# Patient Record
Sex: MALE | Race: WHITE | NOT HISPANIC OR LATINO | Employment: OTHER | URBAN - METROPOLITAN AREA
[De-identification: names, ages, dates, MRNs, and addresses within clinical notes are randomized per-mention and may not be internally consistent; named-entity substitution may affect disease eponyms.]

---

## 2023-07-25 ENCOUNTER — OFFICE VISIT (OUTPATIENT)
Dept: PRIMARY CARE | Facility: CLINIC | Age: 73
End: 2023-07-25
Payer: MEDICARE

## 2023-07-25 VITALS
TEMPERATURE: 96.5 F | OXYGEN SATURATION: 99 % | BODY MASS INDEX: 20.33 KG/M2 | SYSTOLIC BLOOD PRESSURE: 172 MMHG | HEART RATE: 71 BPM | WEIGHT: 142 LBS | HEIGHT: 70 IN | DIASTOLIC BLOOD PRESSURE: 95 MMHG

## 2023-07-25 DIAGNOSIS — B18.2 CHRONIC HEPATITIS C WITHOUT HEPATIC COMA (MULTI): Primary | ICD-10-CM

## 2023-07-25 DIAGNOSIS — Z00.00 HEALTHCARE MAINTENANCE: ICD-10-CM

## 2023-07-25 DIAGNOSIS — Z12.5 PROSTATE CANCER SCREENING: ICD-10-CM

## 2023-07-25 DIAGNOSIS — I10 ESSENTIAL (PRIMARY) HYPERTENSION: ICD-10-CM

## 2023-07-25 DIAGNOSIS — R22.2 SUBCUTANEOUS NODULE OF BACK: ICD-10-CM

## 2023-07-25 DIAGNOSIS — R42 INTERMITTENT LIGHTHEADEDNESS: ICD-10-CM

## 2023-07-25 DIAGNOSIS — R03.0 ELEVATED BLOOD PRESSURE READING: ICD-10-CM

## 2023-07-25 PROBLEM — L03.115 CELLULITIS OF RIGHT LOWER LEG: Status: ACTIVE | Noted: 2023-07-25

## 2023-07-25 PROBLEM — L02.811 ABSCESS OF HEAD: Status: ACTIVE | Noted: 2023-07-25

## 2023-07-25 PROCEDURE — 3080F DIAST BP >= 90 MM HG: CPT | Performed by: INTERNAL MEDICINE

## 2023-07-25 PROCEDURE — 1159F MED LIST DOCD IN RCRD: CPT | Performed by: INTERNAL MEDICINE

## 2023-07-25 PROCEDURE — 3077F SYST BP >= 140 MM HG: CPT | Performed by: INTERNAL MEDICINE

## 2023-07-25 PROCEDURE — 1126F AMNT PAIN NOTED NONE PRSNT: CPT | Performed by: INTERNAL MEDICINE

## 2023-07-25 PROCEDURE — 1036F TOBACCO NON-USER: CPT | Performed by: INTERNAL MEDICINE

## 2023-07-25 PROCEDURE — 99203 OFFICE O/P NEW LOW 30 MIN: CPT | Performed by: INTERNAL MEDICINE

## 2023-07-25 RX ORDER — VELPATASVIR AND SOFOSBUVIR 100; 400 MG/1; MG/1
1 TABLET, FILM COATED ORAL DAILY
COMMUNITY
Start: 2019-11-11 | End: 2024-03-18 | Stop reason: WASHOUT

## 2023-07-25 RX ORDER — CEPHALEXIN 500 MG/1
500 CAPSULE ORAL 4 TIMES DAILY
COMMUNITY
Start: 2019-12-26 | End: 2023-07-25 | Stop reason: ALTCHOICE

## 2023-07-25 RX ORDER — SULFAMETHOXAZOLE AND TRIMETHOPRIM 800; 160 MG/1; MG/1
1 TABLET ORAL 2 TIMES DAILY
COMMUNITY
Start: 2019-12-26 | End: 2023-07-25 | Stop reason: ALTCHOICE

## 2023-07-25 ASSESSMENT — LIFESTYLE VARIABLES
AUDIT-C TOTAL SCORE: 0
HOW MANY STANDARD DRINKS CONTAINING ALCOHOL DO YOU HAVE ON A TYPICAL DAY: PATIENT DOES NOT DRINK
HOW OFTEN DO YOU HAVE A DRINK CONTAINING ALCOHOL: NEVER
HOW OFTEN DO YOU HAVE SIX OR MORE DRINKS ON ONE OCCASION: NEVER
SKIP TO QUESTIONS 9-10: 1

## 2023-07-25 ASSESSMENT — ENCOUNTER SYMPTOMS
LOSS OF SENSATION IN FEET: 0
OCCASIONAL FEELINGS OF UNSTEADINESS: 0
DEPRESSION: 0

## 2023-07-25 ASSESSMENT — PAIN SCALES - GENERAL: PAINLEVEL: 0-NO PAIN

## 2023-07-25 ASSESSMENT — PATIENT HEALTH QUESTIONNAIRE - PHQ9
1. LITTLE INTEREST OR PLEASURE IN DOING THINGS: NOT AT ALL
SUM OF ALL RESPONSES TO PHQ9 QUESTIONS 1 & 2: 0
2. FEELING DOWN, DEPRESSED OR HOPELESS: NOT AT ALL

## 2023-07-25 NOTE — ASSESSMENT & PLAN NOTE
BP is elevated today, this is the initial visit, avoid added salt in the diet, recheck vitals in 2-3 weeks

## 2023-07-25 NOTE — PROGRESS NOTES
Subjective   Patient ID: Mark Neal is a 73 y.o. male who presents for New Patient Visit. Patient used to see Dr Hathaway in 2020, he was dropped from practice since he did not follow up.     HPI    Patient presents for a new patient visit. His last blood work was from February 2020.      Fatigue: He does report that he has been feeling more tired for the past 3-4 years. He used to run consistently, but now gets tired quite quickly and feels uncoordinated. He is still active throughout the day, taking care of the farm. He used to be able to work for 4-5 hours without taking a break, and now he typically works for about 1 hour. Patient takes his time, he takes no med therapy     HTN: Elevated BP reading today in the office, 172/95. He reports that his wife has checked it at home, and she thinks it is a little high there too.     Chronic hepatitis C: Has a history of chronic hepatitis C. This appears to be have been treated throughout 2019 (Epclusa). Has not had any repeat screening since then.     Health maintenance: Has had Cologuard screening done, sent it in today. Does not get flu shots. Covid vaccine x3. Thinks he is up to date on tetanus. Does not recall Shingles vaccine or pneumonia vaccines.   Former smoker between [age], but only occasionally. Does smoke marijuana daily.   Drinks about 5 alcoholic beverages per week.      Review of Systems  In addition to that documented in the HPI above, the additional ROS was obtained:  Constitutional: Denies fevers or chills, denies recent weight loss or weight gain  ENT: does complain of hearing loss (did get a hearing evaluation and hearing aids, but these did not help)  Eyes: Denies vision changes, wearing glasses for 20+ years, denies blurred vision or double vision  ENMT: Denies trouble swallowing  Cardiovascular: Denies chest pain or heart racing  Respiratory: Denies SOB or cough  Gastrointestinal: Denies nausea, vomiting, diarrhea or constipation, denies dark  "stools  Musculoskeletal: Complains of low back pain, denies other joint pain or stiffness  Neuro: denies frequent headaches, did have an episode of dizziness about 3 years ago while driving which resolved after a few minutes, this happens again about every 6 months or so, he has developed some blurred vision that quickly resolves. He denies any irregular heartbeat.   Psych: denies depression or anxiety      Objective     Visit Vitals  BP (!) 172/95 (BP Location: Right arm, Patient Position: Sitting, BP Cuff Size: Adult)   Pulse 71   Temp 35.8 °C (96.5 °F) (Temporal)   Ht 1.778 m (5' 10\")   Wt 64.4 kg (142 lb)   SpO2 99%   BMI 20.37 kg/m²   Smoking Status Never   BSA 1.78 m²      Physical Exam  Constitutional: Well nourished, well developed, appears stated age, he is not overweight  Eyes: no scleral icterus, no conjunctival injection, wearing glasses  Ears: normal external auditory canal, no retraction or bulging of tympanic membranes  Neck: no thyromegaly  Cardiovascular: regular rate and rhythm, no leg edema  Respiratory: normal respiratory effort, clear to auscultation bilaterally  Musculoskeletal: No gross deformities appreciated  Skin: Warm, dry. No rashes. Some scratches about the ankles bilaterally (walks barefoot a lot), a large mobile superficial appearing mobile nodule present on the mid back superficial to the lower T spine, it is at least 3 cm in diameter, no skin changes are appreciated at the site  Neurologic: Alert, CNs II-XII grossly intact..  Psych: Appropriate mood and affect.      Health Maintenance Due   Topic Date Due    Lipid Panel  Never done    Medicare Annual Wellness Visit (AWV)  Never done    Colorectal Cancer Screening  Never done    Hepatitis A Vaccines (1 of 2 - Risk 2-dose series) Never done    Zoster Vaccines (1 of 2) Never done    Hepatitis B Vaccines (1 of 3 - Risk 3-dose series) Never done    Pneumococcal Vaccine: 65+ Years (1 - PCV) Never done    COVID-19 Vaccine (4 - Booster for " Pfizer series) 02/05/2022        Assessment/Plan   Problem List Items Addressed This Visit       Chronic hepatitis C (CMS/HCC) - Primary     Prior history of hepatitis C in 2019, treated and resolved.          Relevant Orders    CBC and Auto Differential    Comprehensive metabolic panel    Elevated blood pressure reading     BP is elevated today, this is the initial visit, avoid added salt in the diet, recheck vitals in 2-3 weeks         Relevant Orders    CBC and Auto Differential    Comprehensive metabolic panel    Albumin, urine, random    ECG 12 lead (Ancillary Performed)    Intermittent lightheadedness     Check an ECG, patient may have some orthostatic visual changes. Check routine labs         Relevant Orders    CBC and Auto Differential    Comprehensive metabolic panel    TSH with reflex to Free T4 if abnormal    ECG 12 lead (Ancillary Performed)    Subcutaneous nodule of back     This appears to be a large lipoma or cyst. Patient has had the lesion for almost 40 years. Will not address this now, will order routine labs for screening         Relevant Orders    CBC and Auto Differential    Comprehensive metabolic panel    Healthcare maintenance    Relevant Orders    CBC and Auto Differential    Comprehensive metabolic panel    Lipid panel     Other Visit Diagnoses       Prostate cancer screening        Relevant Orders    Prostate Spec.Ag,Screen    Essential (primary) hypertension        Relevant Orders    TSH with reflex to Free T4 if abnormal          Will check labs. Follow up after lab work,  and ECG, recheck vitals in a few weeks to see if medication for BP is needed.

## 2023-07-25 NOTE — ASSESSMENT & PLAN NOTE
This appears to be a large lipoma or cyst. Patient has had the lesion for almost 40 years. Will not address this now, will order routine labs for screening

## 2023-08-04 ENCOUNTER — LAB (OUTPATIENT)
Dept: LAB | Facility: LAB | Age: 73
End: 2023-08-04
Payer: MEDICARE

## 2023-08-04 DIAGNOSIS — R42 INTERMITTENT LIGHTHEADEDNESS: ICD-10-CM

## 2023-08-04 DIAGNOSIS — Z00.00 HEALTHCARE MAINTENANCE: ICD-10-CM

## 2023-08-04 DIAGNOSIS — R03.0 ELEVATED BLOOD PRESSURE READING: ICD-10-CM

## 2023-08-04 DIAGNOSIS — B18.2 CHRONIC HEPATITIS C WITHOUT HEPATIC COMA (MULTI): ICD-10-CM

## 2023-08-04 DIAGNOSIS — Z12.5 PROSTATE CANCER SCREENING: ICD-10-CM

## 2023-08-04 DIAGNOSIS — R22.2 SUBCUTANEOUS NODULE OF BACK: ICD-10-CM

## 2023-08-04 LAB
ALANINE AMINOTRANSFERASE (SGPT) (U/L) IN SER/PLAS: 10 U/L (ref 10–52)
ALBUMIN (G/DL) IN SER/PLAS: 4.2 G/DL (ref 3.4–5)
ALBUMIN (MG/L) IN URINE: <7 MG/L
ALBUMIN/CREATININE (UG/MG) IN URINE: NORMAL UG/MG CRT (ref 0–30)
ALKALINE PHOSPHATASE (U/L) IN SER/PLAS: 74 U/L (ref 33–136)
ANION GAP IN SER/PLAS: 10 MMOL/L (ref 10–20)
ASPARTATE AMINOTRANSFERASE (SGOT) (U/L) IN SER/PLAS: 18 U/L (ref 9–39)
BASOPHILS (10*3/UL) IN BLOOD BY AUTOMATED COUNT: 0.07 X10E9/L (ref 0–0.1)
BASOPHILS/100 LEUKOCYTES IN BLOOD BY AUTOMATED COUNT: 1.2 % (ref 0–2)
BILIRUBIN TOTAL (MG/DL) IN SER/PLAS: 0.4 MG/DL (ref 0–1.2)
CALCIUM (MG/DL) IN SER/PLAS: 9.2 MG/DL (ref 8.6–10.3)
CARBON DIOXIDE, TOTAL (MMOL/L) IN SER/PLAS: 27 MMOL/L (ref 21–32)
CHLORIDE (MMOL/L) IN SER/PLAS: 107 MMOL/L (ref 98–107)
CHOLESTEROL (MG/DL) IN SER/PLAS: 175 MG/DL (ref 0–199)
CHOLESTEROL IN HDL (MG/DL) IN SER/PLAS: 70 MG/DL
CHOLESTEROL/HDL RATIO: 2.5
CREATININE (MG/DL) IN SER/PLAS: 0.95 MG/DL (ref 0.5–1.3)
CREATININE (MG/DL) IN URINE: 115 MG/DL (ref 20–370)
EOSINOPHILS (10*3/UL) IN BLOOD BY AUTOMATED COUNT: 0.27 X10E9/L (ref 0–0.4)
EOSINOPHILS/100 LEUKOCYTES IN BLOOD BY AUTOMATED COUNT: 4.5 % (ref 0–6)
ERYTHROCYTE DISTRIBUTION WIDTH (RATIO) BY AUTOMATED COUNT: 13.2 % (ref 11.5–14.5)
ERYTHROCYTE MEAN CORPUSCULAR HEMOGLOBIN CONCENTRATION (G/DL) BY AUTOMATED: 33.1 G/DL (ref 32–36)
ERYTHROCYTE MEAN CORPUSCULAR VOLUME (FL) BY AUTOMATED COUNT: 92 FL (ref 80–100)
ERYTHROCYTES (10*6/UL) IN BLOOD BY AUTOMATED COUNT: 4.89 X10E12/L (ref 4.5–5.9)
GFR MALE: 85 ML/MIN/1.73M2
GLUCOSE (MG/DL) IN SER/PLAS: 94 MG/DL (ref 74–99)
HEMATOCRIT (%) IN BLOOD BY AUTOMATED COUNT: 45 % (ref 41–52)
HEMOGLOBIN (G/DL) IN BLOOD: 14.9 G/DL (ref 13.5–17.5)
IMMATURE GRANULOCYTES/100 LEUKOCYTES IN BLOOD BY AUTOMATED COUNT: 0.5 % (ref 0–0.9)
LDL: 97 MG/DL (ref 0–99)
LEUKOCYTES (10*3/UL) IN BLOOD BY AUTOMATED COUNT: 6 X10E9/L (ref 4.4–11.3)
LYMPHOCYTES (10*3/UL) IN BLOOD BY AUTOMATED COUNT: 1.84 X10E9/L (ref 0.8–3)
LYMPHOCYTES/100 LEUKOCYTES IN BLOOD BY AUTOMATED COUNT: 30.7 % (ref 13–44)
MONOCYTES (10*3/UL) IN BLOOD BY AUTOMATED COUNT: 0.48 X10E9/L (ref 0.05–0.8)
MONOCYTES/100 LEUKOCYTES IN BLOOD BY AUTOMATED COUNT: 8 % (ref 2–10)
NEUTROPHILS (10*3/UL) IN BLOOD BY AUTOMATED COUNT: 3.31 X10E9/L (ref 1.6–5.5)
NEUTROPHILS/100 LEUKOCYTES IN BLOOD BY AUTOMATED COUNT: 55.1 % (ref 40–80)
PLATELETS (10*3/UL) IN BLOOD AUTOMATED COUNT: 244 X10E9/L (ref 150–450)
POTASSIUM (MMOL/L) IN SER/PLAS: 4.4 MMOL/L (ref 3.5–5.3)
PROSTATE SPECIFIC ANTIGEN,SCREEN: 2.53 NG/ML (ref 0–4)
PROTEIN TOTAL: 7 G/DL (ref 6.4–8.2)
SODIUM (MMOL/L) IN SER/PLAS: 140 MMOL/L (ref 136–145)
TRIGLYCERIDE (MG/DL) IN SER/PLAS: 38 MG/DL (ref 0–149)
UREA NITROGEN (MG/DL) IN SER/PLAS: 17 MG/DL (ref 6–23)
VLDL: 8 MG/DL (ref 0–40)

## 2023-08-04 PROCEDURE — 82570 ASSAY OF URINE CREATININE: CPT

## 2023-08-04 PROCEDURE — 85025 COMPLETE CBC W/AUTO DIFF WBC: CPT

## 2023-08-04 PROCEDURE — G0103 PSA SCREENING: HCPCS

## 2023-08-04 PROCEDURE — 36415 COLL VENOUS BLD VENIPUNCTURE: CPT

## 2023-08-04 PROCEDURE — 80061 LIPID PANEL: CPT

## 2023-08-04 PROCEDURE — 82043 UR ALBUMIN QUANTITATIVE: CPT

## 2023-08-04 PROCEDURE — 80053 COMPREHEN METABOLIC PANEL: CPT

## 2023-08-08 ENCOUNTER — CLINICAL SUPPORT (OUTPATIENT)
Dept: PRIMARY CARE | Facility: CLINIC | Age: 73
End: 2023-08-08
Payer: MEDICARE

## 2023-08-08 VITALS
DIASTOLIC BLOOD PRESSURE: 84 MMHG | RESPIRATION RATE: 16 BRPM | OXYGEN SATURATION: 99 % | SYSTOLIC BLOOD PRESSURE: 144 MMHG | HEART RATE: 78 BPM | TEMPERATURE: 97.1 F

## 2023-08-08 DIAGNOSIS — R03.0 ELEVATED BLOOD PRESSURE READING: ICD-10-CM

## 2023-08-08 DIAGNOSIS — I10 PRIMARY HYPERTENSION: Primary | ICD-10-CM

## 2023-08-08 PROCEDURE — 99211 OFF/OP EST MAY X REQ PHY/QHP: CPT | Performed by: INTERNAL MEDICINE

## 2023-08-08 NOTE — PROGRESS NOTES
BP check 144/84 right arm   He also brought in a list of readings from the last few days (they were not dated)    145/78  134/93  154/76  137/85  149/94  127/79

## 2023-08-22 RX ORDER — LISINOPRIL 10 MG/1
10 TABLET ORAL DAILY
Qty: 30 TABLET | Refills: 5 | Status: SHIPPED | OUTPATIENT
Start: 2023-08-22 | End: 2023-09-06

## 2023-09-06 DIAGNOSIS — I10 PRIMARY HYPERTENSION: ICD-10-CM

## 2023-09-06 RX ORDER — LISINOPRIL 10 MG/1
10 TABLET ORAL DAILY
Qty: 90 TABLET | Refills: 2 | Status: SHIPPED | OUTPATIENT
Start: 2023-09-06 | End: 2024-06-04

## 2024-01-18 ENCOUNTER — LAB (OUTPATIENT)
Dept: LAB | Facility: LAB | Age: 74
End: 2024-01-18
Payer: MEDICARE

## 2024-01-18 DIAGNOSIS — I10 ESSENTIAL (PRIMARY) HYPERTENSION: ICD-10-CM

## 2024-01-18 DIAGNOSIS — R42 INTERMITTENT LIGHTHEADEDNESS: ICD-10-CM

## 2024-01-18 LAB — TSH SERPL-ACNC: 1.19 MIU/L (ref 0.44–3.98)

## 2024-01-18 PROCEDURE — 36415 COLL VENOUS BLD VENIPUNCTURE: CPT

## 2024-01-18 PROCEDURE — 84443 ASSAY THYROID STIM HORMONE: CPT

## 2024-03-18 ENCOUNTER — OFFICE VISIT (OUTPATIENT)
Dept: PRIMARY CARE | Facility: CLINIC | Age: 74
End: 2024-03-18
Payer: MEDICARE

## 2024-03-18 VITALS
HEART RATE: 69 BPM | RESPIRATION RATE: 16 BRPM | TEMPERATURE: 97.8 F | SYSTOLIC BLOOD PRESSURE: 140 MMHG | OXYGEN SATURATION: 99 % | HEIGHT: 70 IN | BODY MASS INDEX: 22.33 KG/M2 | DIASTOLIC BLOOD PRESSURE: 80 MMHG | WEIGHT: 156 LBS

## 2024-03-18 DIAGNOSIS — Z12.11 COLON CANCER SCREENING: ICD-10-CM

## 2024-03-18 DIAGNOSIS — I10 ESSENTIAL HYPERTENSION: ICD-10-CM

## 2024-03-18 DIAGNOSIS — Z00.00 ROUTINE GENERAL MEDICAL EXAMINATION AT HEALTH CARE FACILITY: Primary | ICD-10-CM

## 2024-03-18 DIAGNOSIS — B18.2 CHRONIC HEPATITIS C WITHOUT HEPATIC COMA (MULTI): ICD-10-CM

## 2024-03-18 DIAGNOSIS — Z12.5 SCREENING FOR PROSTATE CANCER: ICD-10-CM

## 2024-03-18 DIAGNOSIS — Z13.220 LIPID SCREENING: ICD-10-CM

## 2024-03-18 PROBLEM — R42 DIZZINESS AND GIDDINESS: Status: ACTIVE | Noted: 2023-07-25

## 2024-03-18 PROCEDURE — 1159F MED LIST DOCD IN RCRD: CPT | Performed by: INTERNAL MEDICINE

## 2024-03-18 PROCEDURE — 1170F FXNL STATUS ASSESSED: CPT | Performed by: INTERNAL MEDICINE

## 2024-03-18 PROCEDURE — 3077F SYST BP >= 140 MM HG: CPT | Performed by: INTERNAL MEDICINE

## 2024-03-18 PROCEDURE — 1160F RVW MEDS BY RX/DR IN RCRD: CPT | Performed by: INTERNAL MEDICINE

## 2024-03-18 PROCEDURE — 1036F TOBACCO NON-USER: CPT | Performed by: INTERNAL MEDICINE

## 2024-03-18 PROCEDURE — 3079F DIAST BP 80-89 MM HG: CPT | Performed by: INTERNAL MEDICINE

## 2024-03-18 PROCEDURE — 99214 OFFICE O/P EST MOD 30 MIN: CPT | Performed by: INTERNAL MEDICINE

## 2024-03-18 PROCEDURE — 1123F ACP DISCUSS/DSCN MKR DOCD: CPT | Performed by: INTERNAL MEDICINE

## 2024-03-18 PROCEDURE — G0439 PPPS, SUBSEQ VISIT: HCPCS | Performed by: INTERNAL MEDICINE

## 2024-03-18 SDOH — ECONOMIC STABILITY: FOOD INSECURITY: WITHIN THE PAST 12 MONTHS, THE FOOD YOU BOUGHT JUST DIDN'T LAST AND YOU DIDN'T HAVE MONEY TO GET MORE.: NEVER TRUE

## 2024-03-18 SDOH — ECONOMIC STABILITY: FOOD INSECURITY: WITHIN THE PAST 12 MONTHS, YOU WORRIED THAT YOUR FOOD WOULD RUN OUT BEFORE YOU GOT MONEY TO BUY MORE.: NEVER TRUE

## 2024-03-18 ASSESSMENT — ENCOUNTER SYMPTOMS
LOSS OF SENSATION IN FEET: 0
DEPRESSION: 0
OCCASIONAL FEELINGS OF UNSTEADINESS: 0

## 2024-03-18 ASSESSMENT — ACTIVITIES OF DAILY LIVING (ADL)
TAKING_MEDICATION: INDEPENDENT
BATHING: INDEPENDENT
MANAGING_FINANCES: INDEPENDENT
DRESSING: INDEPENDENT
GROCERY_SHOPPING: INDEPENDENT
DOING_HOUSEWORK: INDEPENDENT

## 2024-03-18 ASSESSMENT — PATIENT HEALTH QUESTIONNAIRE - PHQ9
SUM OF ALL RESPONSES TO PHQ9 QUESTIONS 1 & 2: 0
2. FEELING DOWN, DEPRESSED OR HOPELESS: NOT AT ALL
1. LITTLE INTEREST OR PLEASURE IN DOING THINGS: NOT AT ALL

## 2024-03-18 ASSESSMENT — LIFESTYLE VARIABLES
AUDIT-C TOTAL SCORE: 3
HOW MANY STANDARD DRINKS CONTAINING ALCOHOL DO YOU HAVE ON A TYPICAL DAY: 1 OR 2
SKIP TO QUESTIONS 9-10: 1
HOW OFTEN DO YOU HAVE SIX OR MORE DRINKS ON ONE OCCASION: NEVER
HOW OFTEN DO YOU HAVE A DRINK CONTAINING ALCOHOL: 2-3 TIMES A WEEK

## 2024-03-18 NOTE — PROGRESS NOTES
"Subjective   Reason for Visit: Mark Neal is an 73 y.o. male here for a Medicare Wellness visit.     Past Medical, Surgical, and Family History reviewed and updated in chart.    Reviewed all medications by prescribing practitioner or clinical pharmacist (such as prescriptions, OTCs, herbal therapies and supplements) and documented in the medical record.    HPI    Follow up and Medicare wellness exam:     Fatigue:  Patient notes that dizziness has disappeared. He notes more tiredness when he is not as active in the winter.  Patient takes his time, he takes no med therapy      HTN: Elevated BP reading today in the office, 140/80. Patient only takes lisinopril 10 mg daily       Chronic hepatitis C: Has a history of chronic hepatitis C. This appears to be have been treated throughout 2019 (Epclusa). Has not had any repeat screening since then. No detectable hepatitis C is noted now     Health maintenance: Has had Cologuard screening last year he states. Does not get flu shots. Covid vaccine x3. Thinks he is up to date on tetanus. Does not recall Shingles vaccine or pneumonia vaccines, he does not want these today.   Former smoker between [age], but only occasionally. Does smoke marijuana daily.   Drinks about 5 alcoholic beverages per week.    Subcutaneous nodule on back: this is not new, no changes noted. Patient is hesitant about having surgical procedure now       Patient Care Team:  Henri Hernández MD as PCP - General  Henri Hernández MD as PCP - [employer] Medicare Advantage PCP     Review of Systems    See HPI, no further complaints noted    Objective   Vitals:  /80 (BP Location: Left arm, Patient Position: Sitting, BP Cuff Size: Adult)   Pulse 69   Temp 36.6 °C (97.8 °F)   Resp 16   Ht 1.778 m (5' 10\")   Wt 70.8 kg (156 lb)   SpO2 99%   BMI 22.38 kg/m²       Physical Exam    Constitutional: Well nourished, well developed, appears stated age, he is not overweight  Eyes: no scleral icterus, " no conjunctival injection, wearing glasses  Ears: normal external auditory canal, no retraction or bulging of tympanic membranes  Neck: no thyromegaly, no neck masses or adenopathy  Cardiovascular: regular rate and rhythm, no leg edema, no carotid bruits noted  Respiratory: normal respiratory effort, a few rhonchi that clear with cough, clear to auscultation bilaterally  Musculoskeletal: No gross deformities appreciated  Skin: Warm, dry. No rashes. Some scratches about the ankles bilaterally (walks barefoot a lot), a large mobile superficial appearing mobile nodule present on the mid back superficial to the lower T spine, it is at least 3 cm in diameter, no skin changes are appreciated at the site, this was present last year also  Neurologic: Alert, CNs II-XII grossly intact..  Psych: Appropriate mood and affect.    Assessment/Plan   Problem List Items Addressed This Visit       Chronic hepatitis C (CMS/HCC)    Current Assessment & Plan     Resolved after treatment, most recent viral load was not detectable         Essential hypertension    Current Assessment & Plan     Controlled on lisinopril, no med changes         Routine general medical examination at health care facility - Primary    Current Assessment & Plan     Notify the office when the patient is ready to get his vaccines completed, he defers today, Cologuard screening cannot be documented, will reordered.           Other Visit Diagnoses       Colon cancer screening        Relevant Orders    Cologuard® colon cancer screening          Check labs in 6 months, follow up in 6 months, Medicare wellness exam completed

## 2024-03-18 NOTE — ASSESSMENT & PLAN NOTE
Notify the office when the patient is ready to get his vaccines completed, he defers today, Cologuard screening cannot be documented, will reordered.

## 2024-03-29 LAB — NONINV COLON CA DNA+OCC BLD SCRN STL QL: NEGATIVE

## 2024-06-03 DIAGNOSIS — I10 PRIMARY HYPERTENSION: ICD-10-CM

## 2024-06-04 RX ORDER — LISINOPRIL 10 MG/1
10 TABLET ORAL DAILY
Qty: 90 TABLET | Refills: 2 | Status: SHIPPED | OUTPATIENT
Start: 2024-06-04

## 2024-09-17 ENCOUNTER — LAB (OUTPATIENT)
Dept: LAB | Facility: LAB | Age: 74
End: 2024-09-17
Payer: MEDICARE

## 2024-09-17 DIAGNOSIS — B18.2 CHRONIC HEPATITIS C WITHOUT HEPATIC COMA (MULTI): ICD-10-CM

## 2024-09-17 DIAGNOSIS — Z13.220 LIPID SCREENING: ICD-10-CM

## 2024-09-17 DIAGNOSIS — Z00.00 ROUTINE GENERAL MEDICAL EXAMINATION AT HEALTH CARE FACILITY: ICD-10-CM

## 2024-09-17 DIAGNOSIS — I10 ESSENTIAL HYPERTENSION: ICD-10-CM

## 2024-09-17 DIAGNOSIS — Z12.5 SCREENING FOR PROSTATE CANCER: ICD-10-CM

## 2024-09-17 DIAGNOSIS — Z12.11 COLON CANCER SCREENING: ICD-10-CM

## 2024-09-17 LAB
ALBUMIN SERPL BCP-MCNC: 4.1 G/DL (ref 3.4–5)
ALP SERPL-CCNC: 69 U/L (ref 33–136)
ALT SERPL W P-5'-P-CCNC: 11 U/L (ref 10–52)
ANION GAP SERPL CALC-SCNC: 11 MMOL/L (ref 10–20)
AST SERPL W P-5'-P-CCNC: 18 U/L (ref 9–39)
BASOPHILS # BLD AUTO: 0.05 X10*3/UL (ref 0–0.1)
BASOPHILS NFR BLD AUTO: 0.7 %
BILIRUB SERPL-MCNC: 0.4 MG/DL (ref 0–1.2)
BUN SERPL-MCNC: 17 MG/DL (ref 6–23)
CALCIUM SERPL-MCNC: 8.9 MG/DL (ref 8.6–10.3)
CHLORIDE SERPL-SCNC: 104 MMOL/L (ref 98–107)
CHOLEST SERPL-MCNC: 185 MG/DL (ref 0–199)
CHOLESTEROL/HDL RATIO: 2.3
CO2 SERPL-SCNC: 28 MMOL/L (ref 21–32)
CREAT SERPL-MCNC: 1.01 MG/DL (ref 0.5–1.3)
CREAT UR-MCNC: 101.2 MG/DL (ref 20–370)
EGFRCR SERPLBLD CKD-EPI 2021: 79 ML/MIN/1.73M*2
EOSINOPHIL # BLD AUTO: 0.16 X10*3/UL (ref 0–0.4)
EOSINOPHIL NFR BLD AUTO: 2.2 %
ERYTHROCYTE [DISTWIDTH] IN BLOOD BY AUTOMATED COUNT: 12.9 % (ref 11.5–14.5)
GLUCOSE SERPL-MCNC: 82 MG/DL (ref 74–99)
HCT VFR BLD AUTO: 44.1 % (ref 41–52)
HDLC SERPL-MCNC: 79 MG/DL
HGB BLD-MCNC: 14.4 G/DL (ref 13.5–17.5)
IMM GRANULOCYTES # BLD AUTO: 0.03 X10*3/UL (ref 0–0.5)
IMM GRANULOCYTES NFR BLD AUTO: 0.4 % (ref 0–0.9)
LDLC SERPL CALC-MCNC: 97 MG/DL
LYMPHOCYTES # BLD AUTO: 1.77 X10*3/UL (ref 0.8–3)
LYMPHOCYTES NFR BLD AUTO: 24.9 %
MCH RBC QN AUTO: 30.4 PG (ref 26–34)
MCHC RBC AUTO-ENTMCNC: 32.7 G/DL (ref 32–36)
MCV RBC AUTO: 93 FL (ref 80–100)
MICROALBUMIN UR-MCNC: 8.2 MG/L
MICROALBUMIN/CREAT UR: 8.1 UG/MG CREAT
MONOCYTES # BLD AUTO: 0.5 X10*3/UL (ref 0.05–0.8)
MONOCYTES NFR BLD AUTO: 7 %
NEUTROPHILS # BLD AUTO: 4.61 X10*3/UL (ref 1.6–5.5)
NEUTROPHILS NFR BLD AUTO: 64.8 %
NON HDL CHOLESTEROL: 106 MG/DL (ref 0–149)
NRBC BLD-RTO: 0 /100 WBCS (ref 0–0)
PLATELET # BLD AUTO: 251 X10*3/UL (ref 150–450)
POTASSIUM SERPL-SCNC: 4.7 MMOL/L (ref 3.5–5.3)
PROT SERPL-MCNC: 6.8 G/DL (ref 6.4–8.2)
PSA SERPL-MCNC: 3.29 NG/ML
RBC # BLD AUTO: 4.73 X10*6/UL (ref 4.5–5.9)
SODIUM SERPL-SCNC: 138 MMOL/L (ref 136–145)
TRIGL SERPL-MCNC: 44 MG/DL (ref 0–149)
VLDL: 9 MG/DL (ref 0–40)
WBC # BLD AUTO: 7.1 X10*3/UL (ref 4.4–11.3)

## 2024-09-17 PROCEDURE — 85025 COMPLETE CBC W/AUTO DIFF WBC: CPT

## 2024-09-17 PROCEDURE — 80061 LIPID PANEL: CPT

## 2024-09-17 PROCEDURE — 82570 ASSAY OF URINE CREATININE: CPT

## 2024-09-17 PROCEDURE — 80053 COMPREHEN METABOLIC PANEL: CPT

## 2024-09-17 PROCEDURE — 82043 UR ALBUMIN QUANTITATIVE: CPT

## 2024-09-17 PROCEDURE — G0103 PSA SCREENING: HCPCS

## 2024-09-17 PROCEDURE — 36415 COLL VENOUS BLD VENIPUNCTURE: CPT

## 2024-09-20 ENCOUNTER — APPOINTMENT (OUTPATIENT)
Dept: PRIMARY CARE | Facility: CLINIC | Age: 74
End: 2024-09-20
Payer: MEDICARE

## 2024-09-20 ENCOUNTER — HOSPITAL ENCOUNTER (OUTPATIENT)
Dept: RADIOLOGY | Facility: CLINIC | Age: 74
Discharge: HOME | End: 2024-09-20
Payer: MEDICARE

## 2024-09-20 VITALS
TEMPERATURE: 97 F | SYSTOLIC BLOOD PRESSURE: 132 MMHG | OXYGEN SATURATION: 98 % | WEIGHT: 140 LBS | HEART RATE: 86 BPM | DIASTOLIC BLOOD PRESSURE: 82 MMHG | BODY MASS INDEX: 20.04 KG/M2 | HEIGHT: 70 IN | RESPIRATION RATE: 18 BRPM

## 2024-09-20 DIAGNOSIS — R22.2 SUBCUTANEOUS NODULE OF BACK: ICD-10-CM

## 2024-09-20 DIAGNOSIS — R09.89 RHONCHI: ICD-10-CM

## 2024-09-20 DIAGNOSIS — I10 PRIMARY HYPERTENSION: ICD-10-CM

## 2024-09-20 DIAGNOSIS — I10 ESSENTIAL HYPERTENSION: Primary | ICD-10-CM

## 2024-09-20 DIAGNOSIS — Z12.5 PROSTATE CANCER SCREENING: ICD-10-CM

## 2024-09-20 PROCEDURE — 1159F MED LIST DOCD IN RCRD: CPT | Performed by: INTERNAL MEDICINE

## 2024-09-20 PROCEDURE — 1126F AMNT PAIN NOTED NONE PRSNT: CPT | Performed by: INTERNAL MEDICINE

## 2024-09-20 PROCEDURE — 3079F DIAST BP 80-89 MM HG: CPT | Performed by: INTERNAL MEDICINE

## 2024-09-20 PROCEDURE — 1123F ACP DISCUSS/DSCN MKR DOCD: CPT | Performed by: INTERNAL MEDICINE

## 2024-09-20 PROCEDURE — 3075F SYST BP GE 130 - 139MM HG: CPT | Performed by: INTERNAL MEDICINE

## 2024-09-20 PROCEDURE — 99214 OFFICE O/P EST MOD 30 MIN: CPT | Performed by: INTERNAL MEDICINE

## 2024-09-20 PROCEDURE — 71046 X-RAY EXAM CHEST 2 VIEWS: CPT

## 2024-09-20 PROCEDURE — 3008F BODY MASS INDEX DOCD: CPT | Performed by: INTERNAL MEDICINE

## 2024-09-20 PROCEDURE — 1036F TOBACCO NON-USER: CPT | Performed by: INTERNAL MEDICINE

## 2024-09-20 RX ORDER — LISINOPRIL 10 MG/1
10 TABLET ORAL DAILY
Qty: 90 TABLET | Refills: 2 | Status: SHIPPED | OUTPATIENT
Start: 2024-09-20

## 2024-09-20 ASSESSMENT — PATIENT HEALTH QUESTIONNAIRE - PHQ9
SUM OF ALL RESPONSES TO PHQ9 QUESTIONS 1 & 2: 0
1. LITTLE INTEREST OR PLEASURE IN DOING THINGS: NOT AT ALL
2. FEELING DOWN, DEPRESSED OR HOPELESS: NOT AT ALL

## 2024-09-20 ASSESSMENT — LIFESTYLE VARIABLES
SKIP TO QUESTIONS 9-10: 1
HOW MANY STANDARD DRINKS CONTAINING ALCOHOL DO YOU HAVE ON A TYPICAL DAY: 1 OR 2
HOW OFTEN DO YOU HAVE A DRINK CONTAINING ALCOHOL: 2-3 TIMES A WEEK
HOW OFTEN DO YOU HAVE SIX OR MORE DRINKS ON ONE OCCASION: NEVER
AUDIT-C TOTAL SCORE: 3

## 2024-09-20 ASSESSMENT — PAIN SCALES - GENERAL: PAINLEVEL: 0-NO PAIN

## 2024-09-20 NOTE — ASSESSMENT & PLAN NOTE
This appears to be a lipoma, no additional treatment at this time, patient may see a surgeon for removal if he wishes

## 2024-09-20 NOTE — ASSESSMENT & PLAN NOTE
Patient has smoked marijuana for many years, he does not smoke tobacco. Check a CXR, if any changes are noted, will check a PFT or CT chest, based on the findings

## 2024-09-20 NOTE — PROGRESS NOTES
"Chief Complaint/HPI:    Fatigue:  Patient notes that dizziness has disappeared. He has started to running now.        HTN:  Patient only takes lisinopril 10 mg daily       Chronic hepatitis C: Has a history of chronic hepatitis C. This appears to be have been treated throughout 2019 (Epclusa).      Health maintenance: Has had Cologuard screening last year he states. Does not get flu shots, he has \"phobia\" about getting flu vaccine. Has had Covid vaccine x3. Thinks he is up to date on tetanus.   Former smoker between ages [age], but only occasionally. Does smoke marijuana daily.   Drinks about 5 alcoholic beverages per week.     Subcutaneous nodule on back: this is not new, no changes noted. Patient is hesitant about having surgical procedure now        Patient Care Team:  Henri Hernández MD as PCP - General  Henri Hernández MD as PCP - O Medicare Advantage PCP               ROS otherwise negative aside from what was mentioned above in HPI.      Patient Active Problem List   Diagnosis    Chronic hepatitis C (Multi)    Cellulitis of right lower leg    Abscess of head    Essential hypertension    Intermittent lightheadedness    Subcutaneous nodule of back    Routine general medical examination at health care facility    Dizziness and giddiness    Rhonchi         History reviewed. No pertinent past medical history.  History reviewed. No pertinent surgical history.  Social History     Social History Narrative    Not on file         ALLERGIES  Patient has no known allergies.      MEDICATIONS  Current Outpatient Medications on File Prior to Visit   Medication Sig Dispense Refill    [DISCONTINUED] lisinopril 10 mg tablet TAKE 1 TABLET BY MOUTH EVERY DAY 90 tablet 2     No current facility-administered medications on file prior to visit.         PHYSICAL EXAM  /82 (BP Location: Left arm, Patient Position: Sitting, BP Cuff Size: Large adult)   Pulse 86   Temp 36.1 °C (97 °F)   Resp 18   Ht 1.778 m (5' " "10\")   Wt 63.5 kg (140 lb)   SpO2 98%   BMI 20.09 kg/m²   Body mass index is 20.09 kg/m².    Constitutional: Well nourished, well developed, appears stated age, he is not overweight  Eyes: no scleral icterus, no conjunctival injection, wearing glasses  Ears: normal external auditory canal, no retraction or bulging of tympanic membranes  Neck: no thyromegaly, no neck masses or adenopathy  Cardiovascular: regular rate and rhythm, no leg edema, no carotid bruits noted  Respiratory: normal respiratory effort, a few rhonchi and inspiratory wheezes that clear with cough  Musculoskeletal: No gross deformities appreciated  Skin: Warm, dry. No rashes. Some scratches about the ankles bilaterally (walks barefoot a lot), a large mobile superficial appearing mobile nodule present on the mid back superficial to the lower T spine, it is at least 3 cm in diameter, no skin changes are appreciated at the site, this was present last year also, this is suggestive of a lipomatous lesion  Neurologic: Alert, CNs II-XII grossly intact..  Psych: Appropriate mood and affect.      ASSESSMENT/PLAN  Problem List Items Addressed This Visit       Essential hypertension - Primary    Current Assessment & Plan     BP is controlled, no med changes         Relevant Orders    Albumin-Creatinine Ratio, Urine Random    CBC and Auto Differential    Comprehensive Metabolic Panel    Lipid Panel    Rhonchi    Current Assessment & Plan     Patient has smoked marijuana for many years, he does not smoke tobacco. Check a CXR, if any changes are noted, will check a PFT or CT chest, based on the findings          Relevant Orders    XR chest 2 views    CBC and Auto Differential    Comprehensive Metabolic Panel    Subcutaneous nodule of back    Current Assessment & Plan     This appears to be a lipoma, no additional treatment at this time, patient may see a surgeon for removal if he wishes         Relevant Orders    CBC and Auto Differential    Comprehensive " Metabolic Panel     Other Visit Diagnoses       Primary hypertension        Relevant Medications    lisinopril 10 mg tablet    Other Relevant Orders    CBC and Auto Differential    Comprehensive Metabolic Panel    Prostate cancer screening        Relevant Orders    Prostate Spec.Ag,Screen          Labs appear stable, follow up in 6 months, rhonchi may be due to longstanding Marijuana use. Check a CXR as noted    Henri Hernández MD

## 2025-03-25 ENCOUNTER — APPOINTMENT (OUTPATIENT)
Dept: PRIMARY CARE | Facility: CLINIC | Age: 75
End: 2025-03-25
Payer: MEDICARE

## 2025-03-25 VITALS
SYSTOLIC BLOOD PRESSURE: 152 MMHG | WEIGHT: 150 LBS | BODY MASS INDEX: 21.47 KG/M2 | RESPIRATION RATE: 14 BRPM | HEART RATE: 82 BPM | HEIGHT: 70 IN | DIASTOLIC BLOOD PRESSURE: 88 MMHG | TEMPERATURE: 97.1 F | OXYGEN SATURATION: 97 %

## 2025-03-25 DIAGNOSIS — I10 PRIMARY HYPERTENSION: ICD-10-CM

## 2025-03-25 DIAGNOSIS — R39.11 URINARY HESITANCY: ICD-10-CM

## 2025-03-25 DIAGNOSIS — I10 ESSENTIAL HYPERTENSION: ICD-10-CM

## 2025-03-25 DIAGNOSIS — Z00.00 ROUTINE GENERAL MEDICAL EXAMINATION AT HEALTH CARE FACILITY: Primary | ICD-10-CM

## 2025-03-25 DIAGNOSIS — R22.2 SUBCUTANEOUS NODULE OF BACK: ICD-10-CM

## 2025-03-25 DIAGNOSIS — B18.2 CHRONIC HEPATITIS C WITHOUT HEPATIC COMA (MULTI): ICD-10-CM

## 2025-03-25 PROCEDURE — 3077F SYST BP >= 140 MM HG: CPT | Performed by: INTERNAL MEDICINE

## 2025-03-25 PROCEDURE — 1036F TOBACCO NON-USER: CPT | Performed by: INTERNAL MEDICINE

## 2025-03-25 PROCEDURE — 1125F AMNT PAIN NOTED PAIN PRSNT: CPT | Performed by: INTERNAL MEDICINE

## 2025-03-25 PROCEDURE — 99214 OFFICE O/P EST MOD 30 MIN: CPT | Performed by: INTERNAL MEDICINE

## 2025-03-25 PROCEDURE — 1170F FXNL STATUS ASSESSED: CPT | Performed by: INTERNAL MEDICINE

## 2025-03-25 PROCEDURE — G0439 PPPS, SUBSEQ VISIT: HCPCS | Performed by: INTERNAL MEDICINE

## 2025-03-25 PROCEDURE — 1123F ACP DISCUSS/DSCN MKR DOCD: CPT | Performed by: INTERNAL MEDICINE

## 2025-03-25 PROCEDURE — 1160F RVW MEDS BY RX/DR IN RCRD: CPT | Performed by: INTERNAL MEDICINE

## 2025-03-25 PROCEDURE — 3008F BODY MASS INDEX DOCD: CPT | Performed by: INTERNAL MEDICINE

## 2025-03-25 PROCEDURE — 3079F DIAST BP 80-89 MM HG: CPT | Performed by: INTERNAL MEDICINE

## 2025-03-25 PROCEDURE — 1159F MED LIST DOCD IN RCRD: CPT | Performed by: INTERNAL MEDICINE

## 2025-03-25 RX ORDER — LISINOPRIL 20 MG/1
20 TABLET ORAL DAILY
Qty: 90 TABLET | Refills: 3 | Status: SHIPPED | OUTPATIENT
Start: 2025-03-25 | End: 2026-03-25

## 2025-03-25 SDOH — ECONOMIC STABILITY: FOOD INSECURITY: WITHIN THE PAST 12 MONTHS, THE FOOD YOU BOUGHT JUST DIDN'T LAST AND YOU DIDN'T HAVE MONEY TO GET MORE.: NEVER TRUE

## 2025-03-25 SDOH — ECONOMIC STABILITY: FOOD INSECURITY: WITHIN THE PAST 12 MONTHS, YOU WORRIED THAT YOUR FOOD WOULD RUN OUT BEFORE YOU GOT MONEY TO BUY MORE.: NEVER TRUE

## 2025-03-25 ASSESSMENT — ACTIVITIES OF DAILY LIVING (ADL)
BATHING: INDEPENDENT
GROCERY_SHOPPING: INDEPENDENT
DRESSING: INDEPENDENT
DOING_HOUSEWORK: INDEPENDENT
MANAGING_FINANCES: INDEPENDENT
TAKING_MEDICATION: INDEPENDENT

## 2025-03-25 ASSESSMENT — LIFESTYLE VARIABLES
HOW MANY STANDARD DRINKS CONTAINING ALCOHOL DO YOU HAVE ON A TYPICAL DAY: PATIENT DOES NOT DRINK
AUDIT-C TOTAL SCORE: 0
HOW OFTEN DO YOU HAVE A DRINK CONTAINING ALCOHOL: NEVER
SKIP TO QUESTIONS 9-10: 1
HOW OFTEN DO YOU HAVE SIX OR MORE DRINKS ON ONE OCCASION: NEVER

## 2025-03-25 ASSESSMENT — PAIN SCALES - GENERAL: PAINLEVEL_OUTOF10: 7

## 2025-03-25 ASSESSMENT — ENCOUNTER SYMPTOMS
LOSS OF SENSATION IN FEET: 0
DEPRESSION: 0
OCCASIONAL FEELINGS OF UNSTEADINESS: 0

## 2025-03-25 NOTE — PROGRESS NOTES
"Subjective   Reason for Visit: Mark Neal is an 74 y.o. male here for a Medicare Wellness visit.     Past Medical, Surgical, and Family History reviewed and updated in chart.    Reviewed all medications by prescribing practitioner or clinical pharmacist (such as prescriptions, OTCs, herbal therapies and supplements) and documented in the medical record.    [facility]  Follow up and Medicare wellness exam:  patient feels pretty good when the weather improves he states    Fatigue:  Patient notes that dizziness has disappeared.      HTN:  Patient only takes lisinopril 10 mg daily , BP at home was 144/80      Chronic hepatitis C: Has a history of chronic hepatitis C. This appears to be have been treated throughout 2019 (Epclusa). Follow up was good     Health maintenance: Has had Cologuard screening last 2 years he states. Does not get flu shots, he has \"phobia\" about getting flu vaccine. Has had Covid vaccine x3. Thinks he is up to date on tetanus.   Former smoker between ages [age], but only occasionally. Does smoke marijuana daily , 5 joints.   Drinks about 5 alcoholic beverages per week.  Recommend Prevnar 20 vaccine and RSV vaccine today    Pt stated Urine flow not as strong as it has been previously. Patient gets some urgency during the day, he has some hesitancy with urination during the day. No family history of prostate cancer he states       Subcutaneous nodule on back: this is not new, no changes noted. Patient is hesitant about having surgical procedure now,   Patient Care Team:  Henri Hernández MD as PCP - General (Internal Medicine)  Henri Hernández MD as PCP - [employer] Advantage PCP     Review of Systems     otherwise negative aside from what was mentioned above in HPI.     Objective   Vitals:  /88 (BP Location: Left arm, Patient Position: Sitting, BP Cuff Size: Large adult)   Pulse 82   Temp 36.2 °C (97.1 °F)   Resp 14   Ht 1.765 m (5' 9.5\")   Wt 68 kg (150 lb)   SpO2 97%   " "BMI 21.83 kg/m²       Physical Exam  Constitutional: Well nourished, well developed, appears stated age, he is not overweight  Eyes: no scleral icterus, no conjunctival injection, wearing glasses  Ears: normal external auditory canal, no retraction or bulging of tympanic membranes  Neck: no thyromegaly, no neck masses or adenopathy  Cardiovascular: regular rate and rhythm, no leg edema, no carotid bruits noted  Respiratory: normal respiratory effort, a few rhonchi and inspiratory wheezes that clear with cough  Musculoskeletal: No gross deformities appreciated  Skin: Warm, dry. No rashes. Some scratches about the ankles bilaterally (walks barefoot a lot), a large mobile superficial appearing mobile nodule present on the mid back superficial to the lower T spine, it is at least 3 cm in diameter, no skin changes are appreciated at the site, this was present last year also, this is suggestive of a lipomatous lesion to palpation  Neurologic: Alert, CNs II-XII grossly intact..  Psych: Appropriate mood and affect.    Assessment & Plan  Routine general medical examination at [facility] care facility  Medicare wellness exam completed, will defer Prevnar 20 , \"I still have a phobia\"  Orders:    1 Year Follow Up In Primary Care - Wellness Exam; Future    CBC and Auto Differential; Future    Comprehensive Metabolic Panel; Future    Lipid Panel; Future    Chronic hepatitis C without hepatic coma (Multi)  He is stable now, post treatment, monitor labs  Orders:    CBC and Auto Differential; Future    Comprehensive Metabolic Panel; Future    Essential hypertension  Increase lisinopril to 20 mg daily, continue to monitor  Orders:    Albumin-Creatinine Ratio, Urine Random; Future    CBC and Auto Differential; Future    Comprehensive Metabolic Panel; Future    Subcutaneous nodule of back  This is stable, patient has some hesitancy to have this removed, continue to monitor  Orders:    CBC and Auto Differential; Future    Comprehensive " Metabolic Panel; Future    Primary hypertension  Increase the lisinopril to 20 mg daily  Orders:    lisinopril 20 mg tablet; Take 1 tablet (20 mg) by mouth once daily.    CBC and Auto Differential; Future    Comprehensive Metabolic Panel; Future    Lipid Panel; Future    Urinary hesitancy  Recheck PSA, refer to urology for assessment if the PSA continues to increase, no family history of prostate cancer is noted  Orders:    CBC and Auto Differential; Future    Comprehensive Metabolic Panel; Future    PSA, total and free; Future       Medicare wellness exam completed, he defers vaccines now  Follow up in 6 months

## 2025-03-25 NOTE — ASSESSMENT & PLAN NOTE
This is stable, patient has some hesitancy to have this removed, continue to monitor  Orders:    CBC and Auto Differential; Future    Comprehensive Metabolic Panel; Future

## 2025-03-25 NOTE — ASSESSMENT & PLAN NOTE
"Medicare wellness exam completed, will defer Prevnar 20 , \"I still have a phobia\"  Orders:    1 Year Follow Up In Primary Care - Wellness Exam; Future    CBC and Auto Differential; Future    Comprehensive Metabolic Panel; Future    Lipid Panel; Future    "

## 2025-03-25 NOTE — ASSESSMENT & PLAN NOTE
Increase lisinopril to 20 mg daily, continue to monitor  Orders:    Albumin-Creatinine Ratio, Urine Random; Future    CBC and Auto Differential; Future    Comprehensive Metabolic Panel; Future

## 2025-03-25 NOTE — ASSESSMENT & PLAN NOTE
Recheck PSA, refer to urology for assessment if the PSA continues to increase, no family history of prostate cancer is noted  Orders:    CBC and Auto Differential; Future    Comprehensive Metabolic Panel; Future    PSA, total and free; Future

## 2025-03-25 NOTE — ASSESSMENT & PLAN NOTE
He is stable now, post treatment, monitor labs  Orders:    CBC and Auto Differential; Future    Comprehensive Metabolic Panel; Future

## 2025-04-02 LAB
ALBUMIN SERPL-MCNC: 4.2 G/DL (ref 3.6–5.1)
ALP SERPL-CCNC: 68 U/L (ref 35–144)
ALT SERPL-CCNC: 9 U/L (ref 9–46)
ANION GAP SERPL CALCULATED.4IONS-SCNC: 7 MMOL/L (CALC) (ref 7–17)
AST SERPL-CCNC: 15 U/L (ref 10–35)
BASOPHILS # BLD AUTO: 52 CELLS/UL (ref 0–200)
BASOPHILS NFR BLD AUTO: 1 %
BILIRUB SERPL-MCNC: 0.4 MG/DL (ref 0.2–1.2)
BUN SERPL-MCNC: 18 MG/DL (ref 7–25)
CALCIUM SERPL-MCNC: 9.5 MG/DL (ref 8.6–10.3)
CHLORIDE SERPL-SCNC: 106 MMOL/L (ref 98–110)
CHOLEST SERPL-MCNC: 191 MG/DL
CHOLEST/HDLC SERPL: 2.4 (CALC)
CO2 SERPL-SCNC: 27 MMOL/L (ref 20–32)
CREAT SERPL-MCNC: 1 MG/DL (ref 0.7–1.28)
EGFRCR SERPLBLD CKD-EPI 2021: 79 ML/MIN/1.73M2
EOSINOPHIL # BLD AUTO: 161 CELLS/UL (ref 15–500)
EOSINOPHIL NFR BLD AUTO: 3.1 %
ERYTHROCYTE [DISTWIDTH] IN BLOOD BY AUTOMATED COUNT: 12.8 % (ref 11–15)
GLUCOSE SERPL-MCNC: 88 MG/DL (ref 65–99)
HCT VFR BLD AUTO: 44.1 % (ref 38.5–50)
HDLC SERPL-MCNC: 80 MG/DL
HGB BLD-MCNC: 14.4 G/DL (ref 13.2–17.1)
LDLC SERPL CALC-MCNC: 101 MG/DL (CALC)
LYMPHOCYTES # BLD AUTO: 1628 CELLS/UL (ref 850–3900)
LYMPHOCYTES NFR BLD AUTO: 31.3 %
MCH RBC QN AUTO: 30.6 PG (ref 27–33)
MCHC RBC AUTO-ENTMCNC: 32.7 G/DL (ref 32–36)
MCV RBC AUTO: 93.8 FL (ref 80–100)
MONOCYTES # BLD AUTO: 426 CELLS/UL (ref 200–950)
MONOCYTES NFR BLD AUTO: 8.2 %
NEUTROPHILS # BLD AUTO: 2933 CELLS/UL (ref 1500–7800)
NEUTROPHILS NFR BLD AUTO: 56.4 %
NONHDLC SERPL-MCNC: 111 MG/DL (CALC)
PLATELET # BLD AUTO: 233 THOUSAND/UL (ref 140–400)
PMV BLD REES-ECKER: 9.5 FL (ref 7.5–12.5)
POTASSIUM SERPL-SCNC: 4.6 MMOL/L (ref 3.5–5.3)
PROT SERPL-MCNC: 6.9 G/DL (ref 6.1–8.1)
PSA FREE MFR SERPL: 30 % (CALC)
PSA FREE SERPL-MCNC: 0.8 NG/ML
PSA SERPL-MCNC: 2.7 NG/ML
RBC # BLD AUTO: 4.7 MILLION/UL (ref 4.2–5.8)
SODIUM SERPL-SCNC: 140 MMOL/L (ref 135–146)
TRIGL SERPL-MCNC: 33 MG/DL
WBC # BLD AUTO: 5.2 THOUSAND/UL (ref 3.8–10.8)

## 2025-04-15 LAB
ALBUMIN/CREAT UR: 2 MG/G CREAT
CREAT UR-MCNC: 91 MG/DL (ref 20–320)
MICROALBUMIN UR-MCNC: 0.2 MG/DL

## 2025-10-01 ENCOUNTER — APPOINTMENT (OUTPATIENT)
Dept: PRIMARY CARE | Facility: CLINIC | Age: 75
End: 2025-10-01
Payer: MEDICARE

## 2026-04-01 ENCOUNTER — APPOINTMENT (OUTPATIENT)
Dept: PRIMARY CARE | Facility: CLINIC | Age: 76
End: 2026-04-01
Payer: MEDICARE